# Patient Record
Sex: FEMALE
[De-identification: names, ages, dates, MRNs, and addresses within clinical notes are randomized per-mention and may not be internally consistent; named-entity substitution may affect disease eponyms.]

---
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
## 2024-05-13 ENCOUNTER — APPOINTMENT (OUTPATIENT)
Dept: OBGYN | Facility: CLINIC | Age: 19
End: 2024-05-13

## 2024-05-13 ENCOUNTER — APPOINTMENT (OUTPATIENT)
Dept: OBGYN | Facility: CLINIC | Age: 19
End: 2024-05-13
Payer: COMMERCIAL

## 2024-05-13 VITALS
DIASTOLIC BLOOD PRESSURE: 72 MMHG | BODY MASS INDEX: 20.76 KG/M2 | RESPIRATION RATE: 16 BRPM | WEIGHT: 145 LBS | HEIGHT: 70 IN | SYSTOLIC BLOOD PRESSURE: 114 MMHG

## 2024-05-13 DIAGNOSIS — Z00.00 ENCOUNTER FOR GENERAL ADULT MEDICAL EXAMINATION W/OUT ABNORMAL FINDINGS: ICD-10-CM

## 2024-05-13 DIAGNOSIS — Z01.419 ENCOUNTER FOR GYNECOLOGICAL EXAMINATION (GENERAL) (ROUTINE) W/OUT ABNORMAL FINDINGS: ICD-10-CM

## 2024-05-13 DIAGNOSIS — Z11.3 ENCOUNTER FOR SCREENING FOR INFECTIONS WITH A PREDOMINANTLY SEXUAL MODE OF TRANSMISSION: ICD-10-CM

## 2024-05-13 PROCEDURE — 99395 PREV VISIT EST AGE 18-39: CPT

## 2024-05-13 NOTE — HISTORY OF PRESENT ILLNESS
[Previously active] : previously active [TextBox_4] : Marina is a 20 y/o G0 who presents today for an annual exam.  She recently completed 2 years of college in [facility] but will be transferring to somewhere in the [facility] area.    She is not currently sexually active, uses condoms when she is. She is unsure if she ever received the Gardasil vaccine or not [FreeTextEntry1] : 5/8/24

## 2024-05-13 NOTE — DISCUSSION/SUMMARY
[FreeTextEntry1] : 1) STD cultures obtained 2) consistent condom usage advised 3) Gardasil vaccination strongly advised if not already received 4) STDc cultures performed  Return to office in 1-3 years, sooner prn
